# Patient Record
Sex: FEMALE | Race: BLACK OR AFRICAN AMERICAN | NOT HISPANIC OR LATINO | URBAN - METROPOLITAN AREA
[De-identification: names, ages, dates, MRNs, and addresses within clinical notes are randomized per-mention and may not be internally consistent; named-entity substitution may affect disease eponyms.]

---

## 2023-09-14 RX ORDER — ALBUTEROL SULFATE 90 UG/1
AEROSOL, METERED RESPIRATORY (INHALATION)
COMMUNITY
Start: 2019-04-08 | End: 2023-12-14 | Stop reason: ALTCHOICE

## 2023-09-14 RX ORDER — ACETAMINOPHEN 500 MG
1 TABLET ORAL EVERY 6 HOURS PRN
COMMUNITY
Start: 2017-09-06

## 2023-09-14 RX ORDER — METRONIDAZOLE 500 MG/1
TABLET ORAL 2 TIMES DAILY
COMMUNITY
Start: 2020-07-02 | End: 2023-12-14 | Stop reason: ALTCHOICE

## 2023-09-14 RX ORDER — FLUCONAZOLE 150 MG/1
TABLET ORAL
COMMUNITY
Start: 2020-08-10 | End: 2023-12-14 | Stop reason: ALTCHOICE

## 2023-09-18 ENCOUNTER — APPOINTMENT (OUTPATIENT)
Dept: PRIMARY CARE | Facility: CLINIC | Age: 33
End: 2023-09-18
Payer: COMMERCIAL

## 2023-12-14 ENCOUNTER — OFFICE VISIT (OUTPATIENT)
Dept: PRIMARY CARE | Facility: CLINIC | Age: 33
End: 2023-12-14
Payer: COMMERCIAL

## 2023-12-14 VITALS
WEIGHT: 143.52 LBS | SYSTOLIC BLOOD PRESSURE: 120 MMHG | HEIGHT: 59 IN | BODY MASS INDEX: 28.93 KG/M2 | DIASTOLIC BLOOD PRESSURE: 84 MMHG | OXYGEN SATURATION: 100 % | HEART RATE: 83 BPM | RESPIRATION RATE: 16 BRPM

## 2023-12-14 DIAGNOSIS — F41.9 ANXIETY AND DEPRESSION: ICD-10-CM

## 2023-12-14 DIAGNOSIS — M67.431 GANGLION CYST OF BOTH WRISTS: ICD-10-CM

## 2023-12-14 DIAGNOSIS — D35.2 PITUITARY MICROADENOMA WITH HYPERPROLACTINEMIA (MULTI): ICD-10-CM

## 2023-12-14 DIAGNOSIS — F10.982 ALCOHOL-INDUCED INSOMNIA (MULTI): ICD-10-CM

## 2023-12-14 DIAGNOSIS — R79.89 LOW SERUM SODIUM: ICD-10-CM

## 2023-12-14 DIAGNOSIS — F32.A ANXIETY AND DEPRESSION: ICD-10-CM

## 2023-12-14 DIAGNOSIS — N92.0 MENORRHAGIA WITH REGULAR CYCLE: ICD-10-CM

## 2023-12-14 DIAGNOSIS — F41.9 ANXIETY: Primary | ICD-10-CM

## 2023-12-14 DIAGNOSIS — E22.9 PITUITARY MICROADENOMA WITH HYPERPROLACTINEMIA (MULTI): ICD-10-CM

## 2023-12-14 DIAGNOSIS — M67.432 GANGLION CYST OF BOTH WRISTS: ICD-10-CM

## 2023-12-14 DIAGNOSIS — D64.9 ANEMIA, UNSPECIFIED TYPE: ICD-10-CM

## 2023-12-14 DIAGNOSIS — R22.0 LIP SWELLING: ICD-10-CM

## 2023-12-14 DIAGNOSIS — F43.10 PTSD (POST-TRAUMATIC STRESS DISORDER): ICD-10-CM

## 2023-12-14 PROBLEM — K21.9 GASTROESOPHAGEAL REFLUX DISEASE WITHOUT ESOPHAGITIS: Status: ACTIVE | Noted: 2020-02-10

## 2023-12-14 PROBLEM — F10.10 ALCOHOL ABUSE: Status: ACTIVE | Noted: 2020-01-27

## 2023-12-14 PROCEDURE — 99205 OFFICE O/P NEW HI 60 MIN: CPT | Performed by: PHYSICIAN ASSISTANT

## 2023-12-14 RX ORDER — TRAZODONE HYDROCHLORIDE 50 MG/1
50 TABLET ORAL NIGHTLY PRN
Qty: 30 TABLET | Refills: 0 | Status: SHIPPED | OUTPATIENT
Start: 2023-12-14 | End: 2024-01-09

## 2023-12-14 RX ORDER — SERTRALINE HYDROCHLORIDE 25 MG/1
25 TABLET, FILM COATED ORAL DAILY
Qty: 30 TABLET | Refills: 1 | Status: SHIPPED | OUTPATIENT
Start: 2023-12-14 | End: 2024-01-09

## 2023-12-14 ASSESSMENT — ENCOUNTER SYMPTOMS
ENDOCRINE NEGATIVE: 1
LOSS OF SENSATION IN FEET: 0
DEPRESSION: 0
EYE PAIN: 0
SLEEP DISTURBANCE: 1
TROUBLE SWALLOWING: 0
SHORTNESS OF BREATH: 0
HEADACHES: 0
EYE DISCHARGE: 0
PALPITATIONS: 1
BACK PAIN: 0
OCCASIONAL FEELINGS OF UNSTEADINESS: 0
ALLERGIC/IMMUNOLOGIC NEGATIVE: 1
WHEEZING: 0
ARTHRALGIAS: 0
CONSTITUTIONAL NEGATIVE: 1
FATIGUE: 0
NAUSEA: 0
MUSCULOSKELETAL NEGATIVE: 1
NEUROLOGICAL NEGATIVE: 1
EYE ITCHING: 0
EYE REDNESS: 0
DIZZINESS: 0
EYES NEGATIVE: 1
CHEST TIGHTNESS: 0
SORE THROAT: 0
VOMITING: 0
HEMATOLOGIC/LYMPHATIC NEGATIVE: 1
VOICE CHANGE: 0
ABDOMINAL PAIN: 0
COUGH: 1
NERVOUS/ANXIOUS: 1

## 2023-12-14 NOTE — PROGRESS NOTES
Subjective   Patient ID: Magdalene Lambert is a 33 y.o. female who presents for Establish Care (NPV, ).    HPI   Patient Magdalene Lambert 33 y.o. 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 Reaction to chemicals at work -   Insomnia /Poor sleep - states she can only sleep     PShx: none   Social hx: etoh- social daily 3 beers and 2 shots ,  tobacco use- black and milds , no illicit drug use   No specific diet and exercise- none   immunizations - no flu or covid vaccine - declines today   FMhx: mother- ETOh abuse , father HTN diabetes CHF -  ++ alcoholism   Past medical, surgical, social, and family history all reviewed   denies N/V, headache, dizziness, CP, SOB, palpitations, edema, numbness, tingling, weakness    Review of Systems   Constitutional: Negative.  Negative for fatigue.   HENT: Negative.  Negative for congestion, sore throat, tinnitus, trouble swallowing and voice change.    Eyes: Negative.  Negative for pain, discharge, redness and itching.   Respiratory:  Positive for cough. Negative for chest tightness, shortness of breath and wheezing.    Cardiovascular:  Positive for palpitations. Negative for chest pain.   Gastrointestinal:  Negative for abdominal pain, nausea and vomiting.   Endocrine: Negative.    Genitourinary:  Positive for menstrual problem.   Musculoskeletal: Negative.  Negative for arthralgias and back pain.   Skin: Negative.  Negative for rash.   Allergic/Immunologic: Negative.    Neurological:  "Negative.  Negative for dizziness and headaches.   Hematological: Negative.    Psychiatric/Behavioral:  Positive for sleep disturbance. The patient is nervous/anxious.         Hxx PTSD anxiety depression and bipolar       Objective   /84 (BP Location: Right arm)   Pulse 83   Resp 16   Ht 1.499 m (4' 11\")   Wt 65.1 kg (143 lb 8.3 oz)   SpO2 100%   BMI 28.99 kg/m²     Physical Exam  Vitals and nursing note reviewed.   Constitutional:       Appearance: Normal appearance.   HENT:      Head: Normocephalic and atraumatic.   Cardiovascular:      Rate and Rhythm: Normal rate and regular rhythm.      Heart sounds: Normal heart sounds.   Pulmonary:      Effort: Pulmonary effort is normal.      Breath sounds: Normal breath sounds.   Abdominal:      General: Abdomen is flat. Bowel sounds are normal.      Palpations: Abdomen is soft.   Genitourinary:     Comments: Gyn   Musculoskeletal:      Cervical back: Neck supple.      Comments: + ganglion cysts both wrists   Skin:     General: Skin is warm and dry.   Neurological:      General: No focal deficit present.      Mental Status: She is alert and oriented to person, place, and time.   Psychiatric:         Mood and Affect: Mood normal.         Behavior: Behavior normal.         Thought Content: Thought content normal.         Judgment: Judgment normal.       Assessment/Plan   Problem List Items Addressed This Visit       Pituitary microadenoma with hyperprolactinemia (CMS/HCC)     Other Visit Diagnoses       Anxiety    -  Primary    Relevant Orders    Basic Metabolic Panel    TSH with reflex to Free T4 if abnormal    Referral to Psychiatry    Anemia, unspecified type        Relevant Orders    CBC    Iron and TIBC    TSH with reflex to Free T4 if abnormal    Low serum sodium        Relevant Orders    Basic Metabolic Panel    PTSD (post-traumatic stress disorder)        Relevant Medications    sertraline (Zoloft) 25 mg tablet    traZODone (Desyrel) 50 mg tablet    Other " Relevant Orders    Referral to Psychiatry    Anxiety and depression        Relevant Medications    sertraline (Zoloft) 25 mg tablet    Other Relevant Orders    Referral to Psychiatry    Menorrhagia with regular cycle        Relevant Orders    Referral to Obstetrics / Gynecology    Lip swelling        Relevant Orders    Referral to Allergy    Ganglion cyst of both wrists        Alcohol-induced insomnia (CMS/HCC)        Relevant Medications    traZODone (Desyrel) 50 mg tablet          PTSD etoh abuse/ depression / insomnia   - stable - restart medications - no SI or HI   - refer to psych  Reviewed diagnostic impression, education about situational anxiety, depression, etiology, treatment recommendations including medication, therapy, course of treatment and prognosis  Reviewed r/b/a, possible side effects of the medication.   Patient is aware about the benefits outweigh the risks.  Continue to see therapy/psych   Patient is reminded that if there is SI to call 911 and get themselves to the closest ED for evaluation  - Patient may call to schedule by calling  Psychiatry [phone] to schedule an appointment  - Patient verbalized an understanding to call Mobile Crisis at [phone], [phone], or 106/go to the nearest emergency room if experiences thoughts of harm to self or others.   Advised to stop drinking     Lip swelling - allergic reaction  - stable, ER if worsens  - refer to allergist   - daily antihistamine advised     Anemia -   Refer to GYN   - labs ordered today, will call when results are available   - high iron diet and OTC iron advised       complexity visit of 45+  minutes face-to-face with at least half of the time discussing  Results of my examination, clinical findings, impression and diagnoses discussed with the patient as well as results of the latest test/lab work. The patient was in agreement with the plan and verbalized a good understanding of instructions and plans for treatment. At the end of  the visit today, the patient felt that all questions had been answered satisfactorily. The patient was pleased with the visit and very appreciative for the care rendered.

## 2024-01-09 DIAGNOSIS — F32.A ANXIETY AND DEPRESSION: ICD-10-CM

## 2024-01-09 DIAGNOSIS — F10.982 ALCOHOL-INDUCED INSOMNIA (MULTI): ICD-10-CM

## 2024-01-09 DIAGNOSIS — F43.10 PTSD (POST-TRAUMATIC STRESS DISORDER): ICD-10-CM

## 2024-01-09 DIAGNOSIS — F41.9 ANXIETY AND DEPRESSION: ICD-10-CM

## 2024-01-09 RX ORDER — TRAZODONE HYDROCHLORIDE 50 MG/1
50 TABLET ORAL NIGHTLY PRN
Qty: 30 TABLET | Refills: 0 | Status: SHIPPED | OUTPATIENT
Start: 2024-01-09 | End: 2024-01-25

## 2024-01-09 RX ORDER — SERTRALINE HYDROCHLORIDE 25 MG/1
25 TABLET, FILM COATED ORAL DAILY
Qty: 30 TABLET | Refills: 0 | Status: SHIPPED | OUTPATIENT
Start: 2024-01-09 | End: 2024-01-25

## 2024-01-23 DIAGNOSIS — F10.982 ALCOHOL-INDUCED INSOMNIA (MULTI): ICD-10-CM

## 2024-01-23 DIAGNOSIS — F43.10 PTSD (POST-TRAUMATIC STRESS DISORDER): ICD-10-CM

## 2024-01-23 DIAGNOSIS — F41.9 ANXIETY AND DEPRESSION: ICD-10-CM

## 2024-01-23 DIAGNOSIS — F32.A ANXIETY AND DEPRESSION: ICD-10-CM

## 2024-01-25 RX ORDER — SERTRALINE HYDROCHLORIDE 25 MG/1
25 TABLET, FILM COATED ORAL DAILY
Qty: 30 TABLET | Refills: 0 | Status: SHIPPED | OUTPATIENT
Start: 2024-01-25

## 2024-01-25 RX ORDER — TRAZODONE HYDROCHLORIDE 50 MG/1
50 TABLET ORAL NIGHTLY PRN
Qty: 30 TABLET | Refills: 0 | Status: SHIPPED | OUTPATIENT
Start: 2024-01-25 | End: 2025-01-24

## 2024-03-06 ENCOUNTER — TELEPHONE (OUTPATIENT)
Dept: ALLERGY | Facility: CLINIC | Age: 34
End: 2024-03-06
Payer: COMMERCIAL

## 2024-05-07 ENCOUNTER — TELEPHONE (OUTPATIENT)
Dept: PRIMARY CARE | Facility: CLINIC | Age: 34
End: 2024-05-07

## 2024-05-07 ENCOUNTER — TELEMEDICINE (OUTPATIENT)
Dept: PRIMARY CARE | Facility: CLINIC | Age: 34
End: 2024-05-07
Payer: COMMERCIAL

## 2024-05-07 DIAGNOSIS — F41.9 ANXIETY AND DEPRESSION: ICD-10-CM

## 2024-05-07 DIAGNOSIS — F32.A ANXIETY AND DEPRESSION: ICD-10-CM

## 2024-05-07 DIAGNOSIS — F10.10 ALCOHOL ABUSE: ICD-10-CM

## 2024-05-07 DIAGNOSIS — J06.9 URI WITH COUGH AND CONGESTION: Primary | ICD-10-CM

## 2024-05-07 DIAGNOSIS — J30.9 ALLERGIC RHINITIS, UNSPECIFIED SEASONALITY, UNSPECIFIED TRIGGER: ICD-10-CM

## 2024-05-07 PROCEDURE — 99213 OFFICE O/P EST LOW 20 MIN: CPT | Performed by: PHYSICIAN ASSISTANT

## 2024-05-07 ASSESSMENT — ENCOUNTER SYMPTOMS
STRIDOR: 0
EYE PAIN: 0
RHINORRHEA: 1
PHOTOPHOBIA: 0
SHORTNESS OF BREATH: 0
SINUS PAIN: 0
COUGH: 1
CHILLS: 0
FEVER: 0
SINUS PRESSURE: 0
CHEST TIGHTNESS: 0
EYE REDNESS: 0
ABDOMINAL PAIN: 0
EYE DISCHARGE: 1
EYE ITCHING: 0
DIZZINESS: 0
FATIGUE: 1
PALPITATIONS: 0
ADENOPATHY: 0

## 2024-05-07 NOTE — PROGRESS NOTES
Subjective   Patient ID: Magdalene Lambert is a 33 y.o. female who presents for No chief complaint on file..  An interactive audio and video telecommunication system which permits real time communications between the patient (at the originating site) and provider (at the distant site) was utilized to provide this telehealth service.    Verbal consent was requested and obtained from Magdalene Lambert  on this date, 05/07/24  , for a telehealth visit.     HPI   Patient Magdalene Lambert 33 y.o. female is here today for URI symtoms   Ongoing for weeks - seeing pulmonologist for the past 3 months - pt states she has a follow upon Thursday   +cough and congestion   Also using cough suppressant and antibiotic inhaler -   Hx etoh abuse and anxiety and depression - stable per pt stopped all medication on her own     Review of Systems   Constitutional:  Positive for fatigue. Negative for chills and fever.   HENT:  Positive for congestion, postnasal drip and rhinorrhea. Negative for sinus pressure and sinus pain.    Eyes:  Positive for discharge. Negative for photophobia, pain, redness, itching and visual disturbance.   Respiratory:  Positive for cough. Negative for chest tightness, shortness of breath and stridor.    Cardiovascular:  Negative for chest pain and palpitations.   Gastrointestinal:  Negative for abdominal pain.   Neurological:  Negative for dizziness.   Hematological:  Negative for adenopathy.       Objective   There were no vitals taken for this visit.    Physical Exam  Neurological:      Mental Status: She is alert and oriented to person, place, and time.   Psychiatric:         Mood and Affect: Mood normal.         Behavior: Behavior normal.         Thought Content: Thought content normal.         Judgment: Judgment normal.         Assessment/Plan   Problem List Items Addressed This Visit    None  Visit Diagnoses       URI with cough and congestion    -  Primary    Allergic rhinitis, unspecified seasonality,  unspecified trigger                PLAN:  URI - cough - sees pulmonary on thurs and requesting a refill on hycodan cough syrup- advised pt that she needs to call pulmonary for refill  -  also has a CT scan for 5/17   Symptomatic therapy suggested: Call or return to clinic prn if these symptoms worsen or fail to improve as anticipated.    Pt states anxiety and depression better since she cut back on her drinking  Stopped medications on her own - sertraline and trazodone      This visit was completed via [employer]  . All issues as below were discussed and addressed but no physical exam was performed. If it was felt that the patient should be evaluated in clinic then they were directed there. The patient verbally consented to visit. Spent a total of 20 minutes with patient on Tele-Health discussing health concerns & more than 50% of this time was spent in counseling & coordination of care.

## 2024-07-15 ENCOUNTER — APPOINTMENT (OUTPATIENT)
Dept: PRIMARY CARE | Facility: CLINIC | Age: 34
End: 2024-07-15
Payer: COMMERCIAL

## 2024-08-13 ENCOUNTER — APPOINTMENT (OUTPATIENT)
Dept: PRIMARY CARE | Facility: CLINIC | Age: 34
End: 2024-08-13
Payer: COMMERCIAL

## 2024-08-13 VITALS
WEIGHT: 152.12 LBS | SYSTOLIC BLOOD PRESSURE: 122 MMHG | OXYGEN SATURATION: 98 % | DIASTOLIC BLOOD PRESSURE: 74 MMHG | BODY MASS INDEX: 29.86 KG/M2 | HEIGHT: 60 IN | HEART RATE: 96 BPM

## 2024-08-13 DIAGNOSIS — F10.10 ALCOHOL ABUSE: ICD-10-CM

## 2024-08-13 DIAGNOSIS — F41.9 ANXIETY AND DEPRESSION: ICD-10-CM

## 2024-08-13 DIAGNOSIS — K04.7 DENTAL INFECTION: ICD-10-CM

## 2024-08-13 DIAGNOSIS — F32.A ANXIETY AND DEPRESSION: ICD-10-CM

## 2024-08-13 DIAGNOSIS — F10.982 ALCOHOL-INDUCED INSOMNIA (MULTI): ICD-10-CM

## 2024-08-13 DIAGNOSIS — Z00.00 ROUTINE GENERAL MEDICAL EXAMINATION AT A HEALTH CARE FACILITY: Primary | ICD-10-CM

## 2024-08-13 PROCEDURE — 93000 ELECTROCARDIOGRAM COMPLETE: CPT | Performed by: PHYSICIAN ASSISTANT

## 2024-08-13 PROCEDURE — 3008F BODY MASS INDEX DOCD: CPT | Performed by: PHYSICIAN ASSISTANT

## 2024-08-13 PROCEDURE — 99395 PREV VISIT EST AGE 18-39: CPT | Performed by: PHYSICIAN ASSISTANT

## 2024-08-13 RX ORDER — BUPROPION HYDROCHLORIDE 150 MG/1
150 TABLET ORAL EVERY MORNING
Qty: 30 TABLET | Refills: 1 | Status: SHIPPED | OUTPATIENT
Start: 2024-08-13 | End: 2024-10-12

## 2024-08-13 RX ORDER — AMOXICILLIN 875 MG/1
875 TABLET, FILM COATED ORAL 2 TIMES DAILY
Qty: 20 TABLET | Refills: 0 | Status: SHIPPED | OUTPATIENT
Start: 2024-08-13 | End: 2024-08-23

## 2024-08-13 ASSESSMENT — ENCOUNTER SYMPTOMS
LOSS OF SENSATION IN FEET: 0
BACK PAIN: 0
OCCASIONAL FEELINGS OF UNSTEADINESS: 0
SHORTNESS OF BREATH: 0
RESPIRATORY NEGATIVE: 1
PALPITATIONS: 0
PSYCHIATRIC NEGATIVE: 1
CARDIOVASCULAR NEGATIVE: 1
ENDOCRINE NEGATIVE: 1
HEMATOLOGIC/LYMPHATIC NEGATIVE: 1
EYES NEGATIVE: 1
DIZZINESS: 0
NAUSEA: 0
ABDOMINAL PAIN: 0
NERVOUS/ANXIOUS: 0
ARTHRALGIAS: 0
GASTROINTESTINAL NEGATIVE: 1
HEADACHES: 0
MUSCULOSKELETAL NEGATIVE: 1
COUGH: 0
CONSTITUTIONAL NEGATIVE: 1
VOMITING: 0
ALLERGIC/IMMUNOLOGIC NEGATIVE: 1
WHEEZING: 0
NEUROLOGICAL NEGATIVE: 1
DEPRESSION: 0

## 2024-08-13 ASSESSMENT — PATIENT HEALTH QUESTIONNAIRE - PHQ9
2. FEELING DOWN, DEPRESSED OR HOPELESS: NOT AT ALL
SUM OF ALL RESPONSES TO PHQ9 QUESTIONS 1 AND 2: 0
1. LITTLE INTEREST OR PLEASURE IN DOING THINGS: NOT AT ALL

## 2024-08-13 NOTE — PROGRESS NOTES
Subjective   Patient ID: Magdalene Lambert is a 34 y.o. female who presents for Follow-up.    HPI   Patient Magdalene Lambert 34 y.o. female is here today for CPX and follow up   Stopped sertraline due to n/v   Wants medication to stop smoking and     Single/partner, works - [employer] - lives with partner - no children Having a baby with partner in 2 months   specialists - gyn- DUE   UTD dental and vision UTD      PMhx significant medical hx   bipolar depression and PTSD - no medication - in  therapy -  - no SI or HI - anger issues - also wishes to restart medication-    Etoh abuse with in rehab 3 years ago -   Insomnia /Poor sleep - states she can only sleep      PShx: none   Social hx: etoh- social daily 3 beers and 2 shots ,  tobacco use- black and milds , no illicit drug use   No specific diet and exercise- none   immunizations - no flu or covid vaccine - declines today   FMhx: mother- ETOh abuse , father HTN diabetes CHF -  ++ alcoholism   Past medical, surgical, social, and family history all reviewed   denies N/V, headache, dizziness, CP, SOB, palpitations, edema, numbness, tingling, weakness      Review of Systems   Constitutional: Negative.    HENT: Negative.     Eyes: Negative.    Respiratory: Negative.  Negative for cough, shortness of breath and wheezing.    Cardiovascular: Negative.  Negative for chest pain and palpitations.   Gastrointestinal: Negative.  Negative for abdominal pain, nausea and vomiting.   Endocrine: Negative.    Genitourinary: Negative.    Musculoskeletal: Negative.  Negative for arthralgias and back pain.   Skin: Negative.  Negative for rash.   Allergic/Immunologic: Negative.    Neurological: Negative.  Negative for dizziness and headaches.   Hematological: Negative.    Psychiatric/Behavioral: Negative.  The patient is not nervous/anxious.        Objective   /74 (BP Location: Right arm, Patient Position: Sitting)   Pulse 96   Ht 1.524 m (5')   Wt 69 kg  (152 lb 1.9 oz)   SpO2 98%   BMI 29.71 kg/m²     Physical Exam  Vitals and nursing note reviewed.   Constitutional:       Appearance: Normal appearance. She is normal weight.   HENT:      Head: Normocephalic and atraumatic.      Right Ear: Tympanic membrane, ear canal and external ear normal.      Left Ear: Tympanic membrane, ear canal and external ear normal.      Nose: Nose normal.      Mouth/Throat:      Mouth: Mucous membranes are moist.      Pharynx: Oropharynx is clear.   Eyes:      Extraocular Movements: Extraocular movements intact.      Pupils: Pupils are equal, round, and reactive to light.   Cardiovascular:      Rate and Rhythm: Normal rate and regular rhythm.      Heart sounds: Normal heart sounds.   Pulmonary:      Effort: Pulmonary effort is normal.      Breath sounds: Normal breath sounds.   Abdominal:      General: Abdomen is flat. Bowel sounds are normal.      Palpations: Abdomen is soft.   Genitourinary:     Comments: Per GYN   Musculoskeletal:         General: Normal range of motion.      Cervical back: Normal range of motion and neck supple.   Skin:     General: Skin is warm and dry.   Neurological:      General: No focal deficit present.      Mental Status: She is alert and oriented to person, place, and time.   Psychiatric:         Mood and Affect: Mood normal.         Behavior: Behavior normal.         Thought Content: Thought content normal.         Judgment: Judgment normal.       Assessment/Plan   Problem List Items Addressed This Visit       Alcohol abuse    Alcohol-induced insomnia (Multi)     Other Visit Diagnoses       Routine general medical examination at a health care facility    -  Primary    Relevant Orders    Comprehensive Metabolic Panel    Lipid Panel    Hepatitis C Antibody    Hemoglobin A1C    HIV 1/2 Antigen/Antibody Screen with Reflex to Confirmation    ECG 12 Lead    Anxiety and depression        Relevant Medications    buPROPion XL (Wellbutrin XL) 150 mg 24 hr tablet     Dental infection        Relevant Medications    amoxicillin (Amoxil) 875 mg tablet        Well exam   -  patient stable and healthy  -  discussed healthy diet and exercise plan   -  continue medications as directed, SEs, risks and options discussed   -  f/u with specialists as directed   -  UTD on dental and vision exams, f/u as directed   -  Labs ordered today, pt advised to call office when results are available to discuss with provider    -  EKG ordered today  -  advised smoking cessation  -Vaccinations recommended today: Tdap- will have at [facility] for work   -Follow-up annual exam in one year  -Follow-up in one week to discuss all results    Dental infection - sees dentist next week - RX amox   -Discussed medication dosage, usage, goals of therapy, and side effects  - ER or UCC if worsens     Trial Wellbutrin   -Discussed medication dosage, usage, goals of therapy, and side effects  -Cessation encouraged, You need to stop smoking.  Though it is not easy, more than half of all adult smokers  have quit. We encourage you to write down all the reasons you should quit smoking and  set a quit date for yourself.  Ask us how we can help.  You may also call  800-QUIT-NOW for free resources and assistance  --physiological and physical aspects of tobacco addiction as well as strategies for quitting were discussed  -Counseling was given focusing on the harmful effects of this addiction especially given the patient's medical conditions which will be worsened because of that, goals and tobacco       Results of my examination, clinical findings, impression and diagnoses discussed with the patient as well as results of the latest test/lab work. The patient was in agreement with the plan and verbalized a good understanding of instructions and plans for treatment. At the end of the visit today, the patient felt that all questions had been answered satisfactorily. The patient was pleased with the visit and very appreciative for the  care rendered.

## 2024-08-26 ENCOUNTER — TELEPHONE (OUTPATIENT)
Dept: PRIMARY CARE | Facility: CLINIC | Age: 34
End: 2024-08-26
Payer: COMMERCIAL

## 2024-09-04 DIAGNOSIS — F32.A ANXIETY AND DEPRESSION: ICD-10-CM

## 2024-09-04 DIAGNOSIS — F41.9 ANXIETY AND DEPRESSION: ICD-10-CM

## 2024-09-04 RX ORDER — BUPROPION HYDROCHLORIDE 150 MG/1
150 TABLET ORAL EVERY MORNING
Qty: 90 TABLET | Refills: 0 | Status: SHIPPED | OUTPATIENT
Start: 2024-09-04 | End: 2024-12-03

## 2024-09-18 ENCOUNTER — OFFICE VISIT (OUTPATIENT)
Dept: URGENT CARE | Age: 34
End: 2024-09-18
Payer: COMMERCIAL

## 2024-09-18 VITALS
SYSTOLIC BLOOD PRESSURE: 128 MMHG | HEART RATE: 93 BPM | DIASTOLIC BLOOD PRESSURE: 87 MMHG | WEIGHT: 154 LBS | TEMPERATURE: 98.7 F | RESPIRATION RATE: 16 BRPM | BODY MASS INDEX: 30.08 KG/M2 | OXYGEN SATURATION: 99 %

## 2024-09-18 DIAGNOSIS — R05.1 ACUTE COUGH: ICD-10-CM

## 2024-09-18 DIAGNOSIS — J06.9 VIRAL URI: Primary | ICD-10-CM

## 2024-09-18 LAB — POC SARS-COV-2 AG BINAX: NORMAL

## 2024-09-18 ASSESSMENT — ENCOUNTER SYMPTOMS
VOMITING: 1
COUGH: 1
HEADACHES: 1
NAUSEA: 1

## 2024-09-18 ASSESSMENT — PAIN SCALES - GENERAL: PAINLEVEL: 2

## 2024-09-18 NOTE — PATIENT INSTRUCTIONS
Please follow up with your primary provider within one week if symptoms do not improve.  You may schedule an appointment online at [email or web address] or call [phone]. Go to the Emergency Department if symptoms significantly worsen or if you develop symptoms including but not limited to chest pain or shortness of breath.

## 2024-09-18 NOTE — PROGRESS NOTES
Subjective   Patient ID: Magdalene Lambert is a 34 y.o. female. They present today with a chief complaint of Cough, Headache, and Abdominal Pain (Stomach pain cough headache for 48 hours).    History of Present Illness    Pt presents with illness x 2 days. Sx include dry cough, headache. Exposed to co-workers who are sick with similar sx. She states she is feeling nauseous, had 1 episode of non bloody emesis this morning. Has since tolerated oral intake,ate about 1 hour ago without vomiting. Despite triage chief complaint, pt denies abd pain. She states she has having increased number of bowel movements today but no diarrhea, no blood in stool. She states her chest is hurting when coughing or touching the left side. Denies cardiac history. She is concerned for covid. She is a smoker, smokes black and milds. She denies asthma, COPD hx. Did not try anything for sx at home. No fever, chills, abd pain, diarrhea, constipation, urinary sx, wheezing, sob, sore throat, congestion, runny nose, ear pain.     Past Medical History  Allergies as of 09/18/2024 - Reviewed 09/18/2024   Allergen Reaction Noted    Aspirin Unknown 09/14/2023    Ibuprofen Other 09/14/2023    Metronidazole Nausea/vomiting 12/13/2017    Oxycodone-acetaminophen Nausea/vomiting 05/03/2019       (Not in a hospital admission)       Past Medical History:   Diagnosis Date    Personal history of diseases of the blood and blood-forming organs and certain disorders involving the immune mechanism 03/27/2017    History of anemia    Personal history of other diseases of the respiratory system     History of bronchitis       Past Surgical History:   Procedure Laterality Date    OTHER SURGICAL HISTORY  03/04/2014    Laser Vaporization        reports that she has been smoking cigars. She has never used smokeless tobacco. She reports current alcohol use. She reports that she does not use drugs.    Review of Systems  Review of Systems   Respiratory:  Positive for cough.     Gastrointestinal:  Positive for nausea and vomiting.   Neurological:  Positive for headaches.   All other systems reviewed and are negative.                                 Objective    Vitals:    09/18/24 1651   BP: 128/87   Pulse: 93   Resp: 16   Temp: 37.1 °C (98.7 °F)   SpO2: 99%   Weight: 69.9 kg (154 lb)     No LMP recorded.    Physical Exam  Vitals and nursing note reviewed.   Constitutional:       General: She is not in acute distress.     Appearance: Normal appearance. She is not ill-appearing, toxic-appearing or diaphoretic.   HENT:      Right Ear: Tympanic membrane, ear canal and external ear normal.      Left Ear: Tympanic membrane, ear canal and external ear normal.      Nose: Nose normal.      Mouth/Throat:      Mouth: Mucous membranes are moist.   Cardiovascular:      Rate and Rhythm: Normal rate and regular rhythm.      Pulses: Normal pulses.   Pulmonary:      Breath sounds: No wheezing, rhonchi or rales.   Abdominal:      General: Bowel sounds are normal.      Palpations: Abdomen is soft.      Tenderness: There is no abdominal tenderness. There is no guarding or rebound.   Skin:     Capillary Refill: Capillary refill takes less than 2 seconds.      Findings: No rash.   Neurological:      Mental Status: She is alert.         Procedures    Point of Care Test & Imaging Results from this visit  Results for orders placed or performed in visit on 09/18/24   POCT Covid-19 Rapid Antigen   Result Value Ref Range    POC KENDALL-COV-2 AG  Presumptive negative test for SARS-CoV-2 (no antigen detected)     Presumptive negative test for SARS-CoV-2 (no antigen detected)      No results found.    Diagnostic study results (if any) were reviewed by Dyan Guerrero PA-C.    Assessment/Plan   Allergies, medications, history, and pertinent labs/EKGs/Imaging reviewed by Dyan Guerrero PA-C.     Medical Decision Making  Covid negative  Suspect viral process  Pt declined rx for zofran, tessalon  Advised supportive  care  Return or follow up with primary care for worsening, new or non improving symptoms     Orders and Diagnoses  Diagnoses and all orders for this visit:  Viral URI  Acute cough  -     POCT Covid-19 Rapid Antigen      Medical Admin Record      Patient disposition: Home    Electronically signed by Dyan Guerrero PA-C  5:09 PM

## 2025-01-28 ENCOUNTER — APPOINTMENT (OUTPATIENT)
Dept: PRIMARY CARE | Facility: CLINIC | Age: 35
End: 2025-01-28
Payer: COMMERCIAL

## 2025-01-28 DIAGNOSIS — D35.2 PITUITARY MICROADENOMA WITH HYPERPROLACTINEMIA (MULTI): ICD-10-CM

## 2025-01-28 DIAGNOSIS — F32.A ANXIETY AND DEPRESSION: ICD-10-CM

## 2025-01-28 DIAGNOSIS — F10.982 ALCOHOL-INDUCED INSOMNIA (MULTI): ICD-10-CM

## 2025-01-28 DIAGNOSIS — J45.901 REACTIVE AIRWAY DISEASE WITH ACUTE EXACERBATION, UNSPECIFIED ASTHMA SEVERITY, UNSPECIFIED WHETHER PERSISTENT (HHS-HCC): ICD-10-CM

## 2025-01-28 DIAGNOSIS — F41.9 ANXIETY AND DEPRESSION: ICD-10-CM

## 2025-01-28 DIAGNOSIS — E22.9 PITUITARY MICROADENOMA WITH HYPERPROLACTINEMIA (MULTI): ICD-10-CM

## 2025-01-28 DIAGNOSIS — R11.0 NAUSEA: Primary | ICD-10-CM

## 2025-01-28 DIAGNOSIS — F43.10 PTSD (POST-TRAUMATIC STRESS DISORDER): ICD-10-CM

## 2025-01-28 PROCEDURE — 99213 OFFICE O/P EST LOW 20 MIN: CPT | Performed by: PHYSICIAN ASSISTANT

## 2025-01-28 RX ORDER — ONDANSETRON 4 MG/1
4 TABLET, FILM COATED ORAL EVERY 8 HOURS PRN
Qty: 20 TABLET | Refills: 0 | Status: SHIPPED | OUTPATIENT
Start: 2025-01-28

## 2025-01-28 RX ORDER — TRAZODONE HYDROCHLORIDE 50 MG/1
50 TABLET ORAL NIGHTLY PRN
Qty: 90 TABLET | Refills: 0 | Status: SHIPPED | OUTPATIENT
Start: 2025-01-28 | End: 2026-01-28

## 2025-01-28 RX ORDER — SERTRALINE HYDROCHLORIDE 25 MG/1
25 TABLET, FILM COATED ORAL DAILY
Qty: 90 TABLET | Refills: 0 | Status: SHIPPED | OUTPATIENT
Start: 2025-01-28

## 2025-01-28 ASSESSMENT — ENCOUNTER SYMPTOMS
ABDOMINAL PAIN: 0
HEADACHES: 0
BACK PAIN: 0
ARTHRALGIAS: 0
CONSTITUTIONAL NEGATIVE: 1
NERVOUS/ANXIOUS: 0
NAUSEA: 1
DIZZINESS: 0
SHORTNESS OF BREATH: 0
PALPITATIONS: 0
ALLERGIC/IMMUNOLOGIC NEGATIVE: 1
COUGH: 0
PSYCHIATRIC NEGATIVE: 1
ENDOCRINE NEGATIVE: 1
WHEEZING: 0
HEMATOLOGIC/LYMPHATIC NEGATIVE: 1
MUSCULOSKELETAL NEGATIVE: 1
VOMITING: 0
RESPIRATORY NEGATIVE: 1
NEUROLOGICAL NEGATIVE: 1
EYES NEGATIVE: 1

## 2025-01-28 NOTE — PROGRESS NOTES
Subjective   Patient ID: Magdalene Lambert is a 34 y.o. female who presents for Follow-up.  An interactive audio and video telecommunication system which permits real time communications between the patient (at the originating site) and provider (at the distant site) was utilized to provide this telehealth service.    Verbal consent was requested and obtained from Magdalene Lambert  on this date, 01/28/25  , for a telehealth visit.      HPI     Patient Magdalene Lambert 34 y.o. female is here today for follow up labs and medication refill     patient states feeling well otherwise  denies fever, chills, N/V, headache, dizziness, CP, SOB, palpitations, edema, numbness, tingling, weakness  A chaperone was offered to the patient for the physical exam /  exam and was declined       Review of Systems   Constitutional: Negative.    HENT: Negative.     Eyes: Negative.    Respiratory: Negative.  Negative for cough, shortness of breath and wheezing.    Cardiovascular:  Negative for chest pain and palpitations.   Gastrointestinal:  Positive for nausea. Negative for abdominal pain and vomiting.   Endocrine: Negative.    Genitourinary: Negative.    Musculoskeletal: Negative.  Negative for arthralgias and back pain.   Skin: Negative.  Negative for rash.   Allergic/Immunologic: Negative.    Neurological: Negative.  Negative for dizziness and headaches.   Hematological: Negative.    Psychiatric/Behavioral: Negative.  The patient is not nervous/anxious.         PTSD -        Objective   There were no vitals taken for this visit.    Physical Exam  Constitutional:       Appearance: Normal appearance.   Neurological:      Mental Status: She is alert.   Psychiatric:         Mood and Affect: Mood normal.         Behavior: Behavior normal.         Thought Content: Thought content normal.         Judgment: Judgment normal.         Assessment/Plan   Problem List Items Addressed This Visit       Pituitary microadenoma with hyperprolactinemia  (Multi)    Alcohol-induced insomnia (Multi)    Reactive airway disease with acute exacerbation, unspecified asthma severity, unspecified whether persistent ([facility])     Other Visit Diagnoses       Nausea    -  Primary    Relevant Medications    ondansetron (Zofran) 4 mg tablet    PTSD (post-traumatic stress disorder)        Relevant Medications    sertraline (Zoloft) 25 mg tablet    traZODone (Desyrel) 50 mg tablet    Anxiety and depression        Relevant Medications    sertraline (Zoloft) 25 mg tablet              The patient was in agreement with the plan and verbalized a good understanding of instructions and plans for treatment.       This visit was completed via [employer] due to the restrictions of the COVID-19 pandemic. All issues as below were discussed and addressed but no physical exam was performed. If it was felt that the patient should be evaluated in clinic then they were directed there. The patient verbally consented to visit. Spent a total of 20 minutes with patient on Tele-Health discussing health concerns & more than 50% of this time was spent in counseling & coordination of care.

## 2025-03-31 ENCOUNTER — TELEPHONE (OUTPATIENT)
Dept: PRIMARY CARE | Facility: CLINIC | Age: 35
End: 2025-03-31
Payer: COMMERCIAL

## 2025-04-01 ENCOUNTER — TELEMEDICINE (OUTPATIENT)
Dept: PRIMARY CARE | Facility: CLINIC | Age: 35
End: 2025-04-01
Payer: COMMERCIAL

## 2025-04-01 DIAGNOSIS — L02.91 ABSCESS: Primary | ICD-10-CM

## 2025-04-01 PROCEDURE — 99213 OFFICE O/P EST LOW 20 MIN: CPT | Performed by: PHYSICIAN ASSISTANT

## 2025-04-01 RX ORDER — SULFAMETHOXAZOLE AND TRIMETHOPRIM 800; 160 MG/1; MG/1
1 TABLET ORAL 2 TIMES DAILY
Qty: 14 TABLET | Refills: 0 | Status: SHIPPED | OUTPATIENT
Start: 2025-04-01 | End: 2025-04-08

## 2025-04-01 ASSESSMENT — ENCOUNTER SYMPTOMS
FATIGUE: 0
NAUSEA: 0
RECTAL PAIN: 0
BLOOD IN STOOL: 0
WHEEZING: 0
ENDOCRINE NEGATIVE: 1
HEMATOLOGIC/LYMPHATIC NEGATIVE: 1
VOMITING: 0
RESPIRATORY NEGATIVE: 1
SHORTNESS OF BREATH: 0
NEUROLOGICAL NEGATIVE: 1
NERVOUS/ANXIOUS: 0
FEVER: 0
PALPITATIONS: 0
DIARRHEA: 0
ARTHRALGIAS: 0
DIZZINESS: 0
HEADACHES: 0
ABDOMINAL DISTENTION: 0
COUGH: 0
BACK PAIN: 0
DIAPHORESIS: 0
CHILLS: 0
ABDOMINAL PAIN: 0
PSYCHIATRIC NEGATIVE: 1
ALLERGIC/IMMUNOLOGIC NEGATIVE: 1
ANAL BLEEDING: 0
CONSTIPATION: 0
CONSTITUTIONAL NEGATIVE: 1
EYES NEGATIVE: 1
MUSCULOSKELETAL NEGATIVE: 1

## 2025-04-01 NOTE — PROGRESS NOTES
Subjective   Patient ID: Magdalene Lambert is a 34 y.o. female who presents for Cyst.  An interactive audio and video telecommunication system which permits real time communications between the patient (at the originating site) and provider (at the distant site) was utilized to provide this telehealth service.    Verbal consent was requested and obtained from Magdalene Lambert  on this date, 04/01/25  , for a telehealth visit.      HPI   Patient Magdalene Lambert 34 y.o. female is here today for  cyst on buttocks x 2 days / very painful - open and draining - pt unable to make it to [facility] as advised due to work - also unable to come in today as well - states she is at work     patient states feeling well otherwise  denies fever, chills, N/V, headache, dizziness, CP, SOB, palpitations, edema, numbness, tingling, weakness      Review of Systems   Constitutional: Negative.  Negative for chills, diaphoresis, fatigue and fever.   HENT: Negative.     Eyes: Negative.    Respiratory: Negative.  Negative for cough, shortness of breath and wheezing.    Cardiovascular:  Negative for chest pain and palpitations.   Gastrointestinal:  Negative for abdominal distention, abdominal pain, anal bleeding, blood in stool, constipation, diarrhea, nausea, rectal pain and vomiting.   Endocrine: Negative.    Genitourinary: Negative.    Musculoskeletal: Negative.  Negative for arthralgias and back pain.   Skin: Negative.  Negative for rash.   Allergic/Immunologic: Negative.    Neurological: Negative.  Negative for dizziness and headaches.   Hematological: Negative.    Psychiatric/Behavioral: Negative.  The patient is not nervous/anxious.        Objective   There were no vitals taken for this visit.    Physical Exam  Constitutional:       Appearance: Normal appearance.   Neurological:      Mental Status: She is alert and oriented to person, place, and time.   Psychiatric:         Mood and Affect: Mood normal.         Behavior: Behavior normal.          Thought Content: Thought content normal.         Judgment: Judgment normal.         Assessment/Plan   Problem List Items Addressed This Visit    None  Visit Diagnoses       Abscess    -  Primary    Relevant Medications    sulfamethoxazole-trimethoprim (Bactrim DS) 800-160 mg tablet          Abscess on buttocks   Warm sits bath and compresses   RX bactrim bid x 7 days   -Discussed medication dosage, usage, goals of therapy, and side effects  Recheck in office in 1-2 days or sooner  or ER/UCC        This visit was completed via [employer]. All issues as below were discussed and addressed but no physical exam was performed. If it was felt that the patient should be evaluated in clinic then they were directed there. The patient verbally consented to visit. Spent a total of 20 minutes with patient on Tele-Health discussing health concerns & more than 50% of this time was spent in counseling & coordination of care.

## 2025-04-03 ENCOUNTER — TELEMEDICINE (OUTPATIENT)
Dept: PRIMARY CARE | Facility: CLINIC | Age: 35
End: 2025-04-03
Payer: COMMERCIAL

## 2025-04-03 DIAGNOSIS — L02.91 ABSCESS: Primary | ICD-10-CM

## 2025-04-03 PROCEDURE — 99213 OFFICE O/P EST LOW 20 MIN: CPT | Performed by: PHYSICIAN ASSISTANT

## 2025-04-03 ASSESSMENT — ENCOUNTER SYMPTOMS
ARTHRALGIAS: 0
DIZZINESS: 0
ENDOCRINE NEGATIVE: 1
HEADACHES: 0
NAUSEA: 0
COUGH: 0
HEMATOLOGIC/LYMPHATIC NEGATIVE: 1
SHORTNESS OF BREATH: 0
ABDOMINAL PAIN: 0
VOMITING: 0
MUSCULOSKELETAL NEGATIVE: 1
RESPIRATORY NEGATIVE: 1
PALPITATIONS: 0
NEUROLOGICAL NEGATIVE: 1
EYES NEGATIVE: 1
NERVOUS/ANXIOUS: 0
WHEEZING: 0
CONSTITUTIONAL NEGATIVE: 1
PSYCHIATRIC NEGATIVE: 1
ALLERGIC/IMMUNOLOGIC NEGATIVE: 1
BACK PAIN: 0

## 2025-04-03 NOTE — PROGRESS NOTES
Subjective   Patient ID: Magdalene Lambert is a 34 y.o. female who presents for Follow-up.    HPI Patient Magdalene Lambert 34 y.o. female is here today for follow up abscess- on buttocks-send still draining--but much less pain able to walk with no issues  Still on antibiotic  Normal urine and normal bowels    patient states feeling well otherwise  denies fever, chills, N/V, headache, dizziness, CP, SOB, palpitations, edema, numbness, tingling, weakness  Review of Systems   Constitutional: Negative.    HENT: Negative.     Eyes: Negative.    Respiratory: Negative.  Negative for cough, shortness of breath and wheezing.    Cardiovascular:  Negative for chest pain and palpitations.   Gastrointestinal:  Negative for abdominal pain, nausea and vomiting.   Endocrine: Negative.    Genitourinary: Negative.    Musculoskeletal: Negative.  Negative for arthralgias and back pain.   Skin: Negative.  Negative for rash.        Boil on buttocks draining    Allergic/Immunologic: Negative.    Neurological: Negative.  Negative for dizziness and headaches.   Hematological: Negative.    Psychiatric/Behavioral: Negative.  The patient is not nervous/anxious.        Objective   There were no vitals taken for this visit.      Assessment/Plan   Problem List Items Addressed This Visit    None  Visit Diagnoses       Abscess    -  Primary        Doing better overall much less pain still draining still on antibiotics follow-up next week or ER or urgent care over the weekend if anything changes or gets worse  Patient agrees with plan of care at this time

## 2025-05-02 DIAGNOSIS — F32.A ANXIETY AND DEPRESSION: ICD-10-CM

## 2025-05-02 DIAGNOSIS — F43.10 PTSD (POST-TRAUMATIC STRESS DISORDER): ICD-10-CM

## 2025-05-02 DIAGNOSIS — F41.9 ANXIETY AND DEPRESSION: ICD-10-CM

## 2025-05-02 RX ORDER — SERTRALINE HYDROCHLORIDE 25 MG/1
25 TABLET, FILM COATED ORAL DAILY
Qty: 90 TABLET | Refills: 0 | Status: SHIPPED | OUTPATIENT
Start: 2025-05-02

## 2025-05-02 RX ORDER — TRAZODONE HYDROCHLORIDE 50 MG/1
50 TABLET ORAL NIGHTLY PRN
Qty: 90 TABLET | Refills: 0 | Status: SHIPPED | OUTPATIENT
Start: 2025-05-02 | End: 2026-05-02

## 2025-05-29 ENCOUNTER — TELEPHONE (OUTPATIENT)
Dept: PRIMARY CARE | Facility: CLINIC | Age: 35
End: 2025-05-29
Payer: COMMERCIAL

## 2025-05-29 NOTE — TELEPHONE ENCOUNTER
I called pt back no answer, I left her a detailed message to go to the ER and then we will schedule a follow up appointment with her